# Patient Record
Sex: FEMALE | Race: WHITE | NOT HISPANIC OR LATINO
[De-identification: names, ages, dates, MRNs, and addresses within clinical notes are randomized per-mention and may not be internally consistent; named-entity substitution may affect disease eponyms.]

---

## 2018-12-18 PROBLEM — Z00.00 ENCOUNTER FOR PREVENTIVE HEALTH EXAMINATION: Status: ACTIVE | Noted: 2018-12-18

## 2019-09-17 ENCOUNTER — TRANSCRIPTION ENCOUNTER (OUTPATIENT)
Age: 24
End: 2019-09-17

## 2021-06-28 ENCOUNTER — NON-APPOINTMENT (OUTPATIENT)
Age: 26
End: 2021-06-28

## 2021-06-28 ENCOUNTER — APPOINTMENT (OUTPATIENT)
Dept: ORTHOPEDIC SURGERY | Facility: CLINIC | Age: 26
End: 2021-06-28
Payer: COMMERCIAL

## 2021-06-28 VITALS
DIASTOLIC BLOOD PRESSURE: 78 MMHG | HEIGHT: 68 IN | SYSTOLIC BLOOD PRESSURE: 124 MMHG | WEIGHT: 120 LBS | HEART RATE: 80 BPM | BODY MASS INDEX: 18.19 KG/M2

## 2021-06-28 PROCEDURE — 99204 OFFICE O/P NEW MOD 45 MIN: CPT | Mod: 25

## 2021-06-28 PROCEDURE — 73630 X-RAY EXAM OF FOOT: CPT | Mod: RT

## 2021-06-28 PROCEDURE — 97760 ORTHOTIC MGMT&TRAING 1ST ENC: CPT

## 2021-06-28 PROCEDURE — 99072 ADDL SUPL MATRL&STAF TM PHE: CPT

## 2021-06-28 NOTE — DISCUSSION/SUMMARY
[de-identified] : Today I had a lengthy discussion with the patient regarding their right foot pain. I have addressed all the patient's concerns surrounding the pathology of their condition. XR films were reviewed with the patient. \par \par At this time I would like to obtain advanced imaging of the patient's right foot. An MRI was ordered so I can find out more about the etiology of the patient's condition. The patient should follow up with the office after obtaining the MRI. \par \par I recommended that the patient utilize a short CAM boot. The patient was fitted for the CAM boot in the office today. The patient was educated about the boot wear pattern and utilization, as well as the timeframe to come out of the boot. She was also given full instructions for using the boot. I recommend that the patient utilize 50,000 units of vitamin D once per week. A prescription was given in the office today. I recommend that the patient utilize a bone stimulator to promote further bone healing. The patient should utilize the bone stimulator 2 times per day for 20 minutes at a time. The bone stimulator was ordered for the patient in the office today. \par \par The patient understood and verbally agreed to the treatment plan. All of their questions were answered and they were satisfied with the visit. The patient should call the office if they have any questions or experience worsening symptoms.

## 2021-06-28 NOTE — HISTORY OF PRESENT ILLNESS
[FreeTextEntry1] : PATRICIA ZALDIVAR is a 25 year old female who presents for initial evaluation of right foot pain for 4 years, worse over the past 4 months. 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 She used to run a lot for exercise. She states there is pain when walking.

## 2021-06-28 NOTE — ADDENDUM
[FreeTextEntry1] : I, Alfredo Barakat, acted solely as a scribe for Dr. Zachary Velásquez on this date 06/28/2021  .\par  \par All medical record entries made by the Scribe were at my, Dr. Zachary Velásquez, direction and personally dictated by me on 06/28/2021 . I have reviewed the chart and agree that the record accurately reflects my personal performance of the history, physical exam, assessment and plan. I have also personally directed, reviewed, and agreed with the chart.

## 2021-06-28 NOTE — PHYSICAL EXAM
[de-identified] : General: Alert and oriented x3. In no acute distress. Pleasant in nature with a normal affect. No apparent respiratory distress.\par \par Standing Bilateral Foot Exam:\par Neutral\par \par Right Foot\par Skin: Clean, dry, intact\par Inspection: No obvious malalignment, no masses, no swelling, no effusion\par Pulses: 2+ DP/PT pulses\par ROM: FOOT Full  ROM of digits, ANKLE 5 degrees of dorsiflexion, 40 degrees of plantarflexion, 10 degrees of subtalar motion.\par Painful ROM: None\par Tenderness: + both sesamoids, more on fibular sesamoid. No tenderness over the medial malleolus, No tenderness over the lateral malleolus, no CFL/ATFL/PTFL pain, no deltoid ligament pain. No heel pain. No Achilles tenderness. No 5th metatarsal pain. No pain to the LisFranc joint. No ttp over the posterior tibial tendon.\par Stability: Negative anterior/posterior drawer.\par Strength: 5/5 ADD/ABD/TA/GS/EHL/FHL/EDL\par Neuro: Sensation in tact to light touch throughout\par Additional tests: Negative Mortons test, negative tarsal tunnel tinels, negative single heel rise. \par \par Right Big Toe Exam\par ROM Great toe: 60 degrees of dorsiflexion with pain, 80 degrees of plantarflexion no pain.\par Negative Drawer.	  [de-identified] : 3V of the rig were ordered, obtained, and reviewed by me today, 06/28/2021 , which revealed: fracture lateral sesamoid.

## 2021-07-07 ENCOUNTER — APPOINTMENT (OUTPATIENT)
Dept: MRI IMAGING | Facility: CLINIC | Age: 26
End: 2021-07-07
Payer: COMMERCIAL

## 2021-07-07 ENCOUNTER — OUTPATIENT (OUTPATIENT)
Dept: OUTPATIENT SERVICES | Facility: HOSPITAL | Age: 26
LOS: 1 days | End: 2021-07-07
Payer: COMMERCIAL

## 2021-07-07 DIAGNOSIS — S92.811A OTHER FRACTURE OF RIGHT FOOT, INITIAL ENCOUNTER FOR CLOSED FRACTURE: ICD-10-CM

## 2021-07-07 PROCEDURE — 73718 MRI LOWER EXTREMITY W/O DYE: CPT | Mod: 26,RT

## 2021-07-07 PROCEDURE — 73718 MRI LOWER EXTREMITY W/O DYE: CPT

## 2021-07-19 ENCOUNTER — APPOINTMENT (OUTPATIENT)
Dept: ORTHOPEDIC SURGERY | Facility: CLINIC | Age: 26
End: 2021-07-19
Payer: COMMERCIAL

## 2021-07-19 ENCOUNTER — RX RENEWAL (OUTPATIENT)
Age: 26
End: 2021-07-19

## 2021-07-19 DIAGNOSIS — S92.811A OTHER FRACTURE OF RIGHT FOOT, INITIAL ENCOUNTER FOR CLOSED FRACTURE: ICD-10-CM

## 2021-07-19 PROCEDURE — 99214 OFFICE O/P EST MOD 30 MIN: CPT

## 2021-07-19 PROCEDURE — 99072 ADDL SUPL MATRL&STAF TM PHE: CPT

## 2021-07-19 RX ORDER — ERGOCALCIFEROL 1.25 MG/1
1.25 MG CAPSULE, LIQUID FILLED ORAL
Qty: 4 | Refills: 1 | Status: ACTIVE | COMMUNITY
Start: 2021-06-28 | End: 1900-01-01

## 2021-07-19 NOTE — ADDENDUM
[FreeTextEntry1] : I, Yazmin Chase, acted solely as a scribe for Dr. Zachary Velásquez on this date 07/19/2021.\par \par All medical record entries made by the Scribe were at my, Dr. Zachary Velásquez, direction and personally dictated by me on 07/19/2021 . I have reviewed the chart and agree that the record accurately reflects my personal performance of the history, physical exam, assessment and plan. I have also personally directed, reviewed, and agreed with the chart.	\par

## 2021-07-19 NOTE — PHYSICAL EXAM
[de-identified] : General: Alert and oriented x3. In no acute distress. Pleasant in nature with a normal affect. No apparent respiratory distress.\par \par Standing Bilateral Foot Exam:\par Neutral\par \par Right Foot\par Skin: Clean, dry, intact\par Inspection: No obvious malalignment, no masses, no swelling, no effusion\par Pulses: 2+ DP/PT pulses\par ROM: FOOT Full  ROM of digits, ANKLE 5 degrees of dorsiflexion, 40 degrees of plantarflexion, 10 degrees of subtalar motion.\par Painful ROM: None\par Tenderness: + both sesamoids, more on fibular sesamoid. No tenderness over the medial malleolus, No tenderness over the lateral malleolus, no CFL/ATFL/PTFL pain, no deltoid ligament pain. No heel pain. No Achilles tenderness. No 5th metatarsal pain. No pain to the LisFranc joint. No ttp over the posterior tibial tendon.\par Stability: Negative anterior/posterior drawer.\par Strength: 5/5 ADD/ABD/TA/GS/EHL/FHL/EDL\par Neuro: Sensation in tact to light touch throughout\par Additional tests: Negative Mortons test, negative tarsal tunnel tinels, negative single heel rise. \par \par Right Big Toe Exam\par ROM Great toe: 60 degrees of dorsiflexion with pain, 80 degrees of plantarflexion no pain.\par Negative Drawer.	  [de-identified] : MRI of left foot done on 07/07/2021 results reviewed 7/19/21, results as reported in the chart:\par \par \par INTERPRETATION:  EXAMINATION: MR FOOT RIGHT\par \par CLINICAL INDICATION:Evaluate for sesamoid fracture, nonunion. Chronic foot pain.\par \par COMPARISON: Radiographs dated 6/28/2021\par \par TECHNIQUE: Multiplanar, multi-sequence MRI of the right foot was performed without intravenous contrast.\par \par INTERPRETATION:\par \par Bones: Radiographically, there are 2 distinct fragments of the lateral hallux sesamoid with a thin radiolucent gap, favored to represent a chronic mild fracture rather than a bipartite sesamoid given the morphology of the fragments. There is diffuse T1 hypointense signal within the lateral hallux sesamoid fragments, with a transverse T2 hyperintense gap, and with associated T2 hyperintense cystic change within both fragments. There is no first MTP joint effusion.\par \par Soft Tissues: There is no convincing evidence for a plantar plate tear. There is trace intermetatarsal bursitis at the first interspace.\par \par \par IMPRESSION:\par 1. 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 Bipartite sesamoid with superimposed sesamoiditis is considered less likely.\par \par 2. Trace first interspace intermetatarsal bursitis.\par \par \par --- End of Report ---\par \par \par SHLOMIT A GOLDBERG-STEIN MD; Attending Radiologist\par This document has been electronically signed. Jul 9 2021  1:33PM\par \par

## 2021-07-19 NOTE — DISCUSSION/SUMMARY
[de-identified] : Today I had a lengthy discussion with the patient regarding their right foot pain. I have addressed all the patient's concerns surrounding the pathology of their condition. MRI results of the left foot were reviewed with the patient. I recommend that the patient utilize ice, NSAIDs, PRN, and heat. They can also elevate their right foot above the level of the heart.I recommend that the patient utilize a Ruano extension plate. The patient was provided with the Ruano extension plate in the office today. I advised the patient to utilize Calcitonin Nasal Spray.		\par \par The patient understood and verbally agreed to the treatment plan. All of their questions were answered and they were satisfied with the visit. The patient should call the office if they have any questions or experience worsening symptoms. The patient should follow up 3-4 months, prn.

## 2021-12-22 ENCOUNTER — TRANSCRIPTION ENCOUNTER (OUTPATIENT)
Age: 26
End: 2021-12-22

## 2024-06-13 ENCOUNTER — APPOINTMENT (OUTPATIENT)
Dept: OBGYN | Facility: CLINIC | Age: 29
End: 2024-06-13
Payer: COMMERCIAL

## 2024-06-13 VITALS
DIASTOLIC BLOOD PRESSURE: 75 MMHG | BODY MASS INDEX: 18.49 KG/M2 | WEIGHT: 122 LBS | HEIGHT: 68 IN | SYSTOLIC BLOOD PRESSURE: 120 MMHG

## 2024-06-13 DIAGNOSIS — N92.6 IRREGULAR MENSTRUATION, UNSPECIFIED: ICD-10-CM

## 2024-06-13 DIAGNOSIS — R10.2 PELVIC AND PERINEAL PAIN: ICD-10-CM

## 2024-06-13 DIAGNOSIS — Z01.419 ENCOUNTER FOR GYNECOLOGICAL EXAMINATION (GENERAL) (ROUTINE) W/OUT ABNORMAL FINDINGS: ICD-10-CM

## 2024-06-13 PROCEDURE — 99385 PREV VISIT NEW AGE 18-39: CPT

## 2024-06-13 PROCEDURE — 99459 PELVIC EXAMINATION: CPT

## 2024-06-13 PROCEDURE — 99214 OFFICE O/P EST MOD 30 MIN: CPT | Mod: 25

## 2024-06-13 NOTE — PHYSICAL EXAM
[Chaperone Present] : A chaperone was present in the examining room during all aspects of the physical examination [[id number]] : A chaperone was present during the pelvic exam. [FreeTextEntry2] : leatha narayanan [Appropriately responsive] : appropriately responsive [Alert] : alert [No Acute Distress] : no acute distress [No Lymphadenopathy] : no lymphadenopathy [Regular Rate Rhythm] : regular rate rhythm [No Murmurs] : no murmurs [Clear to Auscultation B/L] : clear to auscultation bilaterally [Soft] : soft [Non-tender] : non-tender [Non-distended] : non-distended [No HSM] : No HSM [No Lesions] : no lesions [No Mass] : no mass [Oriented x3] : oriented x3 [Examination Of The Breasts] : a normal appearance [No Masses] : no breast masses were palpable [Labia Majora] : normal [Labia Minora] : normal [Normal] : normal [Uterine Adnexae] : normal

## 2024-06-13 NOTE — HISTORY OF PRESENT ILLNESS
[FreeTextEntry1] : 28-year-old white female G0 presents as a new patient.  She is here for well visit as well as with complaint of sudden onset right-sided lower abdominal pain that has been going on for about 2 months.  She denies any GI or  symptoms.  She denies any vaginal discharge fevers or chills.  She is unsure as to what brings it on but it is not associated with her menses.  She does report slightly irregular menses.  She is sexually active and monogamous with her fiance and using condoms.  She has no significant medical or surgical history.  She is G0.  She is single.  She denies tobacco or drug use but drinks occasionally.   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  She has a family history of ovarian cancer in her maternal grandmother.  Her mother and all her siblings are fine.  She has not had Gardasil.

## 2024-06-13 NOTE — DISCUSSION/SUMMARY
[FreeTextEntry1] : Pap smear with GC and Chlamydia cultures are performed.  I discussed at length with her condom use and STD prevention and implications of STDs including ectopic pregnancy, infertility etc.  I also discussed birth control options including considering IUDs versus hormonal methods.  I discussed additional benefit from hormonal methods in terms of reduction in risk of ovarian cancer because she has a family history.  For now she will return for genetic testing for this. Patient declines birth control as she is getting  at [employer] [facility] and honeymooning in [place]..  Because of her irregular menses I am sending her for TSH [facility].  Her menses are pretty much every month.  Regarding her pelvic pain I discussed etiology and management with her.  Vaginal cultures were performed to rule out STDs and she will return for pelvic sonogram.

## 2024-06-17 LAB
C TRACH RRNA SPEC QL NAA+PROBE: NOT DETECTED
N GONORRHOEA RRNA SPEC QL NAA+PROBE: NOT DETECTED
SOURCE TP AMPLIFICATION: NORMAL

## 2024-06-19 LAB — CYTOLOGY CVX/VAG DOC THIN PREP: NORMAL

## 2024-09-10 ENCOUNTER — APPOINTMENT (OUTPATIENT)
Dept: ANTEPARTUM | Facility: CLINIC | Age: 29
End: 2024-09-10

## 2024-09-10 ENCOUNTER — APPOINTMENT (OUTPATIENT)
Dept: OBGYN | Facility: CLINIC | Age: 29
End: 2024-09-10